# Patient Record
Sex: FEMALE | Race: NATIVE HAWAIIAN OR OTHER PACIFIC ISLANDER
[De-identification: names, ages, dates, MRNs, and addresses within clinical notes are randomized per-mention and may not be internally consistent; named-entity substitution may affect disease eponyms.]

---

## 2018-10-29 ENCOUNTER — HOSPITAL ENCOUNTER (EMERGENCY)
Dept: HOSPITAL 14 - H.EROB2 | Age: 28
Discharge: ANOTHER HOSPITAL | End: 2018-10-29
Payer: MEDICAID

## 2018-10-29 VITALS
HEART RATE: 92 BPM | DIASTOLIC BLOOD PRESSURE: 63 MMHG | SYSTOLIC BLOOD PRESSURE: 110 MMHG | OXYGEN SATURATION: 100 % | RESPIRATION RATE: 17 BRPM

## 2018-10-29 DIAGNOSIS — O47.1: ICD-10-CM

## 2018-10-29 DIAGNOSIS — O26.93: Primary | ICD-10-CM

## 2018-10-29 DIAGNOSIS — Z3A.36: ICD-10-CM

## 2018-10-29 DIAGNOSIS — R10.2: ICD-10-CM

## 2018-10-29 DIAGNOSIS — R30.0: ICD-10-CM

## 2018-10-29 LAB
BACTERIA #/AREA URNS HPF: (no result) /[HPF]
BILIRUB UR-MCNC: NEGATIVE MG/DL
COLOR UR: YELLOW
GLUCOSE UR STRIP-MCNC: (no result) MG/DL
LEUKOCYTE ESTERASE UR-ACNC: (no result) LEU/UL
PH UR STRIP: 6 [PH] (ref 5–8)
PROT UR STRIP-MCNC: 30 MG/DL
RBC # UR STRIP: NEGATIVE /UL
SP GR UR STRIP: 1.03 (ref 1–1.03)
SQUAMOUS EPITHIAL: 15 /HPF (ref 0–5)
URINE CLARITY: (no result)
UROBILINOGEN UR-MCNC: (no result) MG/DL (ref 0.2–1)

## 2018-10-29 PROCEDURE — 96360 HYDRATION IV INFUSION INIT: CPT

## 2018-10-29 PROCEDURE — 87086 URINE CULTURE/COLONY COUNT: CPT

## 2018-10-29 PROCEDURE — 81003 URINALYSIS AUTO W/O SCOPE: CPT

## 2018-10-29 PROCEDURE — 99283 EMERGENCY DEPT VISIT LOW MDM: CPT

## 2018-10-29 NOTE — OBHP
===========================

Datetime: 10/29/2018 15:26

===========================

   

IP Adm Impression:  , intrauterine pregnancy

IP Admit Plan:  Observation/Evaluation; Discharge home

Admit Comment, IP Provider:  28-year-old  at 36.6 weeks (based on 1st tri u/s) presents with l
ower abdominal and LUQ pain since 11am today (4 hours prior to presentation). She also admits to rece
nt dysuria but denies burning, chills, and fever. She admits that prior to the start of the pain she 
was carrying her 2.5 year old to the doctor's office. She denies loss of fluid and vaginal bleeding. 
Good fetal movement. She denies any complications with pregnancy thus far and has been following at a
 OBGYN in [place] for her prenatal care (arrived to [facility] Oct 6). Her first pregnancy resulted in a 
, patient does not remember how many weeks she was but reports that they went home in 3 days with no 
complications.

      

   PMH: asthma (rescue inhaler - hasn't used in 1 year)

   Social: denies smoking, alcohol, and illicit drugs

   Meds: denies

   Allergy: NKDA

   FHx: denies

   Surgical Hx: denies

      

   ROS: denies fever, chills, nausea, change in vision, headache, chest pain, shortness of breath, vo
miting, diarrhea, constipation and edema

      

   PE: comfortable, no acute distress

   CV: RRR

   Resp: no respiratory distress

   Extremities: no pitting edema

   Pelvic: chaperone present. 50/-3. no active bleeding. no lesions noted.

      

   Assessment: 28-year-old  at 36.6 weeks (based on 1st tri u/s) presents with lower abdominal
 and LUQ pain since 11am today (4 hours prior to presentation). 

      

   Plan:

   -Urine Culture, Urinalysis

   -1L LR @ 999 mls/hr

   -EFM: Reassuring- Category I, , moderate variability, accelerations present 15x15, no decel
erations. uterus activity sporadic, no regular contractions

      

   Patient seen and discussed with Dr Young.

   -Karla Rose, PGY1 [facility] [place]

      

   OB Hospitalist Addendum: Pt seen and examined by me.  Agree w/ above.  29 yo  at 36+6 wks w
/ EDC 2108 per pt who came here from [place] on 10/06/2018, reports that she has lower abdominal 
pain and LLQ pain since this am after carrying her 1 yo to a doctor's office.  VE  50/-2 at 1518.  
Ua adri rare, sq epi 15 H, c/w contamination.  Urine cx pending. Pt given IVF and tylenol.  Pt reporte
d that she felt better and wanted to go home.  Repeat VE  unchanged at 1725 and pt discharged home.  
FHT reactive.  Pt given # to [facility].  Pt given strict labor precautions.  Pt also told to bring a copy o
f prenatal records from [place] when she returns to [facility]. (ES) 

Pelvic Type - PN:  Adequate

Extremities - PN:  Normal

Abdomen - PN:  Normal

Back - PN:  Normal

Breast - PN:  Not Done

Lungs - PN:  Normal

Heart - PN:  Normal

Thyroid - PN:  Not Done

Neurologic - PN:  Not Done

HEENT - PN:  Not Done

General - PN:  Normal

FHR - Baseline A Provider:  130

EGA AdmitDate IP:  -15.1

Vital Signs Provider:  Reviewed; Within Normal Limits

IP Chief Complaint:  Other

NICHD Variability Prov Fetus A:  Moderate 6-25bpm

NICHD Accel Fetus A IP Provider:  15X15

NICHD Decel Fetus A IP Provider:  None

Dilatation, Provider:  2

Effacement, Provider:  50

Station, Provider:  -3

Genitourinary Exam:  Not Done

DTRs - PN:  Not Done

## 2018-11-07 ENCOUNTER — HOSPITAL ENCOUNTER (EMERGENCY)
Dept: HOSPITAL 14 - H.EROB2 | Age: 28
Discharge: HOME | End: 2018-11-07
Payer: MEDICAID

## 2018-11-07 DIAGNOSIS — R10.2: ICD-10-CM

## 2018-11-07 DIAGNOSIS — O26.93: Primary | ICD-10-CM

## 2018-11-07 DIAGNOSIS — Z3A.38: ICD-10-CM

## 2018-11-07 NOTE — OBHP
===========================

Datetime: 2018 19:36

===========================

   

IP Adm Impression:  Term, intrauterine pregnancy; No Active Labor

IP Admit Plan:  Observation/Evaluation

Admit Comment, IP Provider:  The patient is a  2 para 1 estimated due date 2018 estimate
d gestational age 38 weeks patient presents to labor and delivery complaining of pelvic pressure occa
sional uterine contractions. Patient denies any vaginal bleeding any leakage of fluid she reports goo
d fetal movement. Patient had prenatal care and [place] patient does not have records with her she naomie
es any complications throughout this pregnancy.

   Past medical history asthma

   Past surgical history none

   No known drug allergies

   Medications prenatal vitamins

   Social history denies alcohol tobacco use

      

   Review of systems patient denies headache chest pain shortness of breath palpitations nausea vomit
ing diarrhea heat or cold intolerance easy bruisability musculoskeletal or neurological complaints

      

   Vital signs stable afebrile

   Physical exam see notes

   Cervical exam 2 cm dilated and 50% effaced -2

      

   Intrauterine pregnancy at 38 weeks

   Observation

   External fetal monitor

   Adequate pelvis

   Vertex presentation

   Estimated fetal weight 7 pounds

Pelvic Type - PN:  Adequate

Extremities - PN:  Normal

Abdomen - PN:  Normal

Back - PN:  Not Done

Breast - PN:  Not Done

Lungs - PN:  Normal

Heart - PN:  Normal

Thyroid - PN:  Normal

Neurologic - PN:  Normal

HEENT - PN:  Normal

General - PN:  Normal

FHR - Baseline A Provider:  145

Gestation - Est Wks by US:  38.0

Pool Provider:  Negative

EGA AdmitDate IP:  38.1

Vital Signs Provider:  Reviewed

IP Chief Complaint:  Uterine contractions

NICHD Variability Prov Fetus A:  Moderate 6-25bpm

NICHD Accel Fetus A IP Provider:  15X15

FHR Category Provider Fetus A:  Category I

NICHD Decel Fetus A IP Provider:  None

Dilatation, Provider:  2

Effacement, Provider:  50

Station, Provider:  -2

Genitourinary Exam:  Normal

DTRs - PN:  Normal

## 2018-11-08 VITALS
OXYGEN SATURATION: 98 % | TEMPERATURE: 98.5 F | SYSTOLIC BLOOD PRESSURE: 96 MMHG | HEART RATE: 112 BPM | RESPIRATION RATE: 18 BRPM | DIASTOLIC BLOOD PRESSURE: 80 MMHG